# Patient Record
Sex: MALE | Race: OTHER | HISPANIC OR LATINO
[De-identification: names, ages, dates, MRNs, and addresses within clinical notes are randomized per-mention and may not be internally consistent; named-entity substitution may affect disease eponyms.]

---

## 2017-12-20 ENCOUNTER — RESULT REVIEW (OUTPATIENT)
Age: 62
End: 2017-12-20

## 2020-01-07 ENCOUNTER — APPOINTMENT (OUTPATIENT)
Dept: NEUROSURGERY | Facility: CLINIC | Age: 65
End: 2020-01-07
Payer: MEDICARE

## 2020-01-07 VITALS
SYSTOLIC BLOOD PRESSURE: 122 MMHG | HEART RATE: 58 BPM | OXYGEN SATURATION: 99 % | BODY MASS INDEX: 24.11 KG/M2 | DIASTOLIC BLOOD PRESSURE: 70 MMHG | HEIGHT: 66 IN | WEIGHT: 150 LBS

## 2020-01-07 DIAGNOSIS — Z45.42 ENC FOR ADJUSTMENT AND MANAGEMENT OF NEUROSTIMULATOR: ICD-10-CM

## 2020-01-07 DIAGNOSIS — G20 PARKINSON'S DISEASE: ICD-10-CM

## 2020-01-07 DIAGNOSIS — Z87.898 PERSONAL HISTORY OF OTHER SPECIFIED CONDITIONS: ICD-10-CM

## 2020-01-07 DIAGNOSIS — N28.1 CYST OF KIDNEY, ACQUIRED: ICD-10-CM

## 2020-01-07 DIAGNOSIS — Z86.79 PERSONAL HISTORY OF OTHER DISEASES OF THE CIRCULATORY SYSTEM: ICD-10-CM

## 2020-01-07 DIAGNOSIS — Z83.3 FAMILY HISTORY OF DIABETES MELLITUS: ICD-10-CM

## 2020-01-07 DIAGNOSIS — Z86.69 PERSONAL HISTORY OF OTHER DISEASES OF THE NERVOUS SYSTEM AND SENSE ORGANS: ICD-10-CM

## 2020-01-07 DIAGNOSIS — Z87.438 PERSONAL HISTORY OF OTHER DISEASES OF MALE GENITAL ORGANS: ICD-10-CM

## 2020-01-07 DIAGNOSIS — Z86.73 PERSONAL HISTORY OF TRANSIENT ISCHEMIC ATTACK (TIA), AND CEREBRAL INFARCTION W/OUT RESIDUAL DEFICITS: ICD-10-CM

## 2020-01-07 DIAGNOSIS — Z86.59 PERSONAL HISTORY OF OTHER MENTAL AND BEHAVIORAL DISORDERS: ICD-10-CM

## 2020-01-07 DIAGNOSIS — Z82.49 FAMILY HISTORY OF ISCHEMIC HEART DISEASE AND OTHER DISEASES OF THE CIRCULATORY SYSTEM: ICD-10-CM

## 2020-01-07 DIAGNOSIS — Z86.39 PERSONAL HISTORY OF OTHER ENDOCRINE, NUTRITIONAL AND METABOLIC DISEASE: ICD-10-CM

## 2020-01-07 PROBLEM — Z00.00 ENCOUNTER FOR PREVENTIVE HEALTH EXAMINATION: Status: ACTIVE | Noted: 2020-01-07

## 2020-01-07 PROCEDURE — 99204 OFFICE O/P NEW MOD 45 MIN: CPT

## 2020-01-10 PROBLEM — N28.1 RENAL CYST: Status: RESOLVED | Noted: 2020-01-10 | Resolved: 2020-01-10

## 2020-01-10 PROBLEM — Z83.3 FAMILY HISTORY OF DIABETES MELLITUS: Status: ACTIVE | Noted: 2020-01-10

## 2020-01-10 PROBLEM — Z86.39 HISTORY OF HYPERLIPIDEMIA: Status: RESOLVED | Noted: 2020-01-10 | Resolved: 2020-01-10

## 2020-01-10 PROBLEM — Z86.79 HISTORY OF CORONARY ARTERY DISEASE: Status: RESOLVED | Noted: 2020-01-10 | Resolved: 2020-01-10

## 2020-01-10 PROBLEM — Z86.79 HISTORY OF HYPERTENSION: Status: RESOLVED | Noted: 2020-01-10 | Resolved: 2020-01-10

## 2020-01-10 PROBLEM — Z86.69 HISTORY OF NEUROPATHY: Status: RESOLVED | Noted: 2020-01-10 | Resolved: 2020-01-10

## 2020-01-10 PROBLEM — Z86.69 HISTORY OF RETINOPATHY: Status: RESOLVED | Noted: 2020-01-10 | Resolved: 2020-01-10

## 2020-01-10 PROBLEM — G20 PARKINSON'S DISEASE: Status: ACTIVE | Noted: 2020-01-10

## 2020-01-10 PROBLEM — Z45.42 END OF BATTERY LIFE OF DEEP BRAIN STIMULATOR: Status: ACTIVE | Noted: 2020-01-10

## 2020-01-10 PROBLEM — Z86.73 H/O: CVA (CEREBROVASCULAR ACCIDENT): Status: RESOLVED | Noted: 2020-01-10 | Resolved: 2020-01-10

## 2020-01-10 PROBLEM — Z86.39 HISTORY OF TYPE 2 DIABETES MELLITUS: Status: RESOLVED | Noted: 2020-01-10 | Resolved: 2020-01-10

## 2020-01-10 PROBLEM — Z87.898 HISTORY OF URINARY INCONTINENCE: Status: RESOLVED | Noted: 2020-01-10 | Resolved: 2020-01-10

## 2020-01-10 PROBLEM — Z86.59 HISTORY OF DEPRESSION: Status: RESOLVED | Noted: 2020-01-10 | Resolved: 2020-01-10

## 2020-01-10 PROBLEM — Z87.438 H/O ACUTE PROSTATITIS: Status: RESOLVED | Noted: 2020-01-10 | Resolved: 2020-01-10

## 2020-01-10 PROBLEM — Z82.49 FAMILY HISTORY OF HYPERTENSION: Status: ACTIVE | Noted: 2020-01-10

## 2020-01-10 RX ORDER — INSULIN LISPRO 100 [IU]/ML
100 INJECTION, SOLUTION INTRAVENOUS; SUBCUTANEOUS
Refills: 0 | Status: ACTIVE | COMMUNITY

## 2020-01-10 RX ORDER — CARBIDOPA AND LEVODOPA 25; 100 MG/1; MG/1
25-100 TABLET ORAL
Refills: 0 | Status: ACTIVE | COMMUNITY

## 2020-01-10 RX ORDER — METOPROLOL TARTRATE 50 MG/1
50 TABLET, FILM COATED ORAL
Refills: 0 | Status: ACTIVE | COMMUNITY

## 2020-01-10 RX ORDER — SACUBITRIL AND VALSARTAN 49; 51 MG/1; MG/1
49-51 TABLET, FILM COATED ORAL
Refills: 0 | Status: ACTIVE | COMMUNITY

## 2020-01-10 RX ORDER — ASPIRIN 81 MG
81 TABLET, DELAYED RELEASE (ENTERIC COATED) ORAL
Refills: 0 | Status: ACTIVE | COMMUNITY

## 2020-01-10 RX ORDER — ATORVASTATIN CALCIUM 40 MG/1
40 TABLET, FILM COATED ORAL
Refills: 0 | Status: ACTIVE | COMMUNITY

## 2020-01-10 RX ORDER — INSULIN GLARGINE 100 [IU]/ML
100 INJECTION, SOLUTION SUBCUTANEOUS
Refills: 0 | Status: ACTIVE | COMMUNITY

## 2020-01-10 RX ORDER — METFORMIN HYDROCHLORIDE 500 MG/1
500 TABLET, COATED ORAL
Refills: 0 | Status: ACTIVE | COMMUNITY

## 2020-01-10 RX ORDER — TAMSULOSIN HYDROCHLORIDE 0.4 MG/1
0.4 CAPSULE ORAL
Refills: 0 | Status: ACTIVE | COMMUNITY

## 2020-01-20 VITALS
OXYGEN SATURATION: 100 % | RESPIRATION RATE: 16 BRPM | SYSTOLIC BLOOD PRESSURE: 130 MMHG | HEIGHT: 67 IN | DIASTOLIC BLOOD PRESSURE: 67 MMHG | TEMPERATURE: 98 F | HEART RATE: 54 BPM | WEIGHT: 147.05 LBS

## 2020-01-20 NOTE — ASU PATIENT PROFILE, ADULT - PMH
CAD (coronary artery disease)    CAD (coronary artery disease)    CHF (congestive heart failure)    Depression    Diabetes mellitus, type 2    HLD (hyperlipidemia)    HTN (hypertension)    Ischemic stroke  L CVA, with residual r sided weakness  Neuropathy    Parkinson's disease    Renal cyst, left    Retinopathy    Urinary incontinence

## 2020-01-20 NOTE — ASU PREOP CHECKLIST - 1.
Pt doesn't talk as much- Daughter Jenny is answering all the questions due to previous stroke. AS per Jenny, patient understands and speak a little bit

## 2020-01-20 NOTE — ASU PATIENT PROFILE, ADULT - PSH
History of surgery  coronary stent placement  History of surgery  right ankle  S/P deep brain stimulator placement

## 2020-01-21 ENCOUNTER — RESULT REVIEW (OUTPATIENT)
Age: 65
End: 2020-01-21

## 2020-01-21 ENCOUNTER — OUTPATIENT (OUTPATIENT)
Dept: OUTPATIENT SERVICES | Facility: HOSPITAL | Age: 65
LOS: 1 days | Discharge: HOME | End: 2020-01-21
Payer: MEDICARE

## 2020-01-21 VITALS
SYSTOLIC BLOOD PRESSURE: 128 MMHG | DIASTOLIC BLOOD PRESSURE: 69 MMHG | HEART RATE: 57 BPM | RESPIRATION RATE: 15 BRPM | OXYGEN SATURATION: 100 %

## 2020-01-21 DIAGNOSIS — Z98.890 OTHER SPECIFIED POSTPROCEDURAL STATES: Chronic | ICD-10-CM

## 2020-01-21 DIAGNOSIS — Z96.89 PRESENCE OF OTHER SPECIFIED FUNCTIONAL IMPLANTS: Chronic | ICD-10-CM

## 2020-01-21 LAB
GLUCOSE BLDC GLUCOMTR-MCNC: 121 MG/DL — HIGH (ref 70–99)
GLUCOSE BLDC GLUCOMTR-MCNC: 153 MG/DL — HIGH (ref 70–99)

## 2020-01-21 PROCEDURE — 95983 ALYS BRN NPGT PRGRMG 15 MIN: CPT | Mod: 59

## 2020-01-21 PROCEDURE — 88300 SURGICAL PATH GROSS: CPT

## 2020-01-21 PROCEDURE — 82962 GLUCOSE BLOOD TEST: CPT

## 2020-01-21 PROCEDURE — 86850 RBC ANTIBODY SCREEN: CPT

## 2020-01-21 PROCEDURE — 88300 SURGICAL PATH GROSS: CPT | Mod: 26

## 2020-01-21 PROCEDURE — 61886 IMPLANT NEUROSTIM ARRAYS: CPT | Mod: LT

## 2020-01-21 PROCEDURE — C1767: CPT

## 2020-01-21 PROCEDURE — 86901 BLOOD TYPING SEROLOGIC RH(D): CPT

## 2020-01-21 PROCEDURE — 61886 IMPLANT NEUROSTIM ARRAYS: CPT

## 2020-01-21 PROCEDURE — C1787: CPT

## 2020-01-21 RX ORDER — SODIUM CHLORIDE 9 MG/ML
1000 INJECTION, SOLUTION INTRAVENOUS
Refills: 0 | Status: DISCONTINUED | OUTPATIENT
Start: 2020-01-21 | End: 2020-01-21

## 2020-01-21 RX ORDER — POVIDONE-IODINE 5 %
1 AEROSOL (ML) TOPICAL ONCE
Refills: 0 | Status: COMPLETED | OUTPATIENT
Start: 2020-01-21 | End: 2020-01-21

## 2020-01-21 RX ORDER — ACETAMINOPHEN WITH CODEINE 300MG-30MG
1 TABLET ORAL EVERY 4 HOURS
Refills: 0 | Status: DISCONTINUED | OUTPATIENT
Start: 2020-01-21 | End: 2020-01-21

## 2020-01-21 RX ORDER — ACETAMINOPHEN WITH CODEINE 300MG-30MG
1 TABLET ORAL
Qty: 12 | Refills: 0
Start: 2020-01-21 | End: 2020-01-23

## 2020-01-21 RX ADMIN — Medication 1 APPLICATION(S): at 10:33

## 2020-01-21 NOTE — PACU DISCHARGE NOTE - COMMENTS
discharge instruction given to pt/daughter ,vss, hemodynamically stable. dsg intact /dry po fluids tolerated. saline lock removed /d.c on awheelchair wth daughter.

## 2020-01-21 NOTE — H&P ADULT - NSICDXPASTMEDICALHX_GEN_ALL_CORE_FT
PAST MEDICAL HISTORY:  CAD (coronary artery disease)     CAD (coronary artery disease)     CHF (congestive heart failure)     Depression     Diabetes mellitus, type 2     HLD (hyperlipidemia)     HTN (hypertension)     Ischemic stroke L CVA, with residual r sided weakness    Neuropathy     Parkinson's disease     Renal cyst, left     Retinopathy     Urinary incontinence

## 2020-01-21 NOTE — H&P ADULT - NSICDXPASTSURGICALHX_GEN_ALL_CORE_FT
PAST SURGICAL HISTORY:  History of surgery right ankle    History of surgery coronary stent placement    S/P deep brain stimulator placement

## 2020-01-21 NOTE — H&P ADULT - NSHPPHYSICALEXAM_GEN_ALL_CORE
Orientation: oriented to person.   Language: fluency not intact.   Motor Strength:. the patient is right hand dominant.   Eyes: the sclera and conjunctiva were normal.   ENT: the ears and nose were normal in appearance.   Neck: the appearance of the neck was normal.   Pulmonary: no respiratory distress.   Vascular:. there was no peripheral edema.   Skin: normal skin color and pigmentation and no rash . LEFT CW IPG site intact and well healed.

## 2020-01-21 NOTE — H&P ADULT - HISTORY OF PRESENT ILLNESS
This patient is a 64M w/ PD since 2011, s/p DBS placement at [facility] Nov 2016 w course c/b stroke. Presents today for battery replacement (end of life) with Dr. Razo.

## 2020-01-27 LAB — SURGICAL PATHOLOGY STUDY: SIGNIFICANT CHANGE UP
